# Patient Record
(demographics unavailable — no encounter records)

---

## 2018-01-11 NOTE — PN
PROGRESS NOTE



FOLLOW-UP VISIT



DATE OF SERVICE:

01/11/2018



This 53-year-old lady has been followed in sleep center for treatment of obstructive

sleep apnea-hypopnea syndrome.



Patient continued to use her CPAP equipment without significant problems every night

and no snoring with the machine. No sleepiness during the day.  [facility]

Scale is 3.



I checked patient's CPAP unit.  CPAP pressure is 14.  Usage is 28/30 nights for more

than 4 hours.  Average usage is 8 hours. Leak is 13 L/minute, which is acceptable.

Apnea-hypopnea index is 1.2, which is normal.



MEDICATIONS:

Synthroid, losartan, Singulair, Nexium, atorvastatin, Lamisil, trazodone.



Recently patient increased puffiness of her face that happened when her dose of thyroid

medication was adjusted down.



PHYSICAL EXAM:

During physical exam, patient in no distress.

VITAL SIGNS:  /87, HR 72, RR 16, height 5 feet 5 inches, weight 233, BMI 38.7.

The patient increased her weight 14 pounds comparing with the previous visit. Temp

97.5, oxygen saturation room air 94%.

HEENT:  PERRLA, EOMI. Oropharynx extremely low position of soft palate.

NECK:  Supple, no JVD.  Thyroid is not palpable.

LUNGS:  Clear to percussion and to auscultation.  Good air exchange.  No wheezing or

rhonchi.

HEART:  S1, S2 regular.  No murmurs, gallops, or rubs.

ABDOMEN:  Obese.

EXTREMITIES: No clubbing or cyanosis.

CNS: Awake, alert, and oriented X3.  Cranial nerves 2 to 7 intact.  There is no

fasciculation or atrophy. noted.  No focal deficits observed.



IMPRESSION:

1. Severe obstructive sleep apnea-hypopnea syndrome on full control with CPAP.  The

    patient demonstrated practically 100% compliance with treatment.  No sleepiness

    during the day.  No snoring.

2. Obesity patient increased her weight on 13 pounds.

3. Hypertension.

4. History of asthma.

5. Acid reflux.

6. Hypothyroidism.

7. History of depression.

8. Status post cholecystectomy.

9. Status post tubal ligation.

10.Patient is a .



PLAN:

1. Patient will continue to use her CPAP equipment every night.

2. Losing weight.

3. Sleep hygiene with regular time in bed for at least 8 hours.

4. Patient is a .  She is aware about civil and criminal liability

    for unsafe driving.  She promised to follow recommendations.

5. No driving if feeling any sleepiness.

6. Prescription for all necessary CPAP supplies including mask, tube, filters.

Thank you very much for allowing me to participate in management of your patient.



Sincerely,







Antwon Juarez MD, PhD, FAASM

Diplomat of [employer]

[employer]

Medical Director of [facility] [place]





MMODL / IJN: [id number] / Job#: [id number]